# Patient Record
Sex: MALE | Race: WHITE | NOT HISPANIC OR LATINO | Employment: OTHER | URBAN - NONMETROPOLITAN AREA
[De-identification: names, ages, dates, MRNs, and addresses within clinical notes are randomized per-mention and may not be internally consistent; named-entity substitution may affect disease eponyms.]

---

## 2020-09-11 ENCOUNTER — HOSPITAL ENCOUNTER (EMERGENCY)
Facility: HOSPITAL | Age: 30
Discharge: HOME | End: 2020-09-11
Attending: FAMILY MEDICINE
Payer: OTHER GOVERNMENT

## 2020-09-11 VITALS
HEIGHT: 68 IN | RESPIRATION RATE: 20 BRPM | HEART RATE: 69 BPM | SYSTOLIC BLOOD PRESSURE: 139 MMHG | OXYGEN SATURATION: 100 % | TEMPERATURE: 99 F | WEIGHT: 198 LBS | BODY MASS INDEX: 30.01 KG/M2 | DIASTOLIC BLOOD PRESSURE: 79 MMHG

## 2020-09-11 DIAGNOSIS — R52 PAIN: ICD-10-CM

## 2020-09-11 DIAGNOSIS — S81.811A LACERATION OF RIGHT LOWER EXTREMITY, INITIAL ENCOUNTER: Primary | ICD-10-CM

## 2020-09-11 DIAGNOSIS — S80.11XA CONTUSION OF RIGHT LOWER EXTREMITY, INITIAL ENCOUNTER: ICD-10-CM

## 2020-09-11 PROCEDURE — 99283 EMERGENCY DEPT VISIT LOW MDM: CPT | Mod: 25

## 2020-09-11 PROCEDURE — 12001 RPR S/N/AX/GEN/TRNK 2.5CM/<: CPT

## 2020-09-11 PROCEDURE — 25000003 PHARM REV CODE 250: Performed by: NURSE PRACTITIONER

## 2020-09-11 RX ORDER — LIDOCAINE HYDROCHLORIDE 10 MG/ML
10 INJECTION INFILTRATION; PERINEURAL
Status: COMPLETED | OUTPATIENT
Start: 2020-09-11 | End: 2020-09-11

## 2020-09-11 RX ORDER — IBUPROFEN 800 MG/1
800 TABLET ORAL
Status: COMPLETED | OUTPATIENT
Start: 2020-09-11 | End: 2020-09-11

## 2020-09-11 RX ORDER — IBUPROFEN 800 MG/1
800 TABLET ORAL EVERY 6 HOURS PRN
Qty: 20 TABLET | Refills: 0 | Status: SHIPPED | OUTPATIENT
Start: 2020-09-11

## 2020-09-11 RX ADMIN — LIDOCAINE HYDROCHLORIDE 10 ML: 10 INJECTION, SOLUTION INFILTRATION; PERINEURAL at 06:09

## 2020-09-11 RX ADMIN — IBUPROFEN 800 MG: 800 TABLET, FILM COATED ORAL at 07:09

## 2020-09-11 RX ADMIN — BACITRACIN ZINC NEOMYCIN SULFATE POLYMYXIN B SULFATE: 400; 3.5; 5 OINTMENT TOPICAL at 07:09

## 2020-09-11 NOTE — ED NOTES
Wound cleansed with ns and gauze, bleeding resolved pta per pt, sybil, letty, ambulated from lobby with a steady gait

## 2020-09-11 NOTE — ED PROVIDER NOTES
Encounter Date: 9/11/2020       History     Chief Complaint   Patient presents with    Laceration     ran in to a pipe sticking out the ground and has laceration to right lower leg     Pt c/o pain and appr 1 cm laceration to right lower leg that he sustained PTA.  Pt states he was working outside and accidentally walked into a thick metal pipe sticking out of the ground.  Pt states he has mild bone tenderness around the laceration.  Pt denies any other injuries.  NO TX PTA.         Review of patient's allergies indicates:  No Known Allergies  Past Medical History:   Diagnosis Date    Hypertension      Past Surgical History:   Procedure Laterality Date    KNEE SURGERY      KNEE SURGERY      VASECTOMY       History reviewed. No pertinent family history.  Social History     Tobacco Use    Smoking status: Former Smoker    Smokeless tobacco: Former User   Substance Use Topics    Alcohol use: Yes     Alcohol/week: 2.0 standard drinks     Types: 2 Cans of beer per week    Drug use: Not on file     Review of Systems   Musculoskeletal:        + pain and laceration to right shin    Skin:        + laceration 1 cm to right shin    All other systems reviewed and are negative.      Physical Exam     Initial Vitals [09/11/20 1719]   BP Pulse Resp Temp SpO2   (!) 149/85 69 18 99.2 °F (37.3 °C) 100 %      MAP       --         Physical Exam    Nursing note and vitals reviewed.  Constitutional: He appears well-developed and well-nourished. He is not diaphoretic. No distress.   HENT:   Head: Normocephalic.   Eyes: Pupils are equal, round, and reactive to light.   Neck: Neck supple.   Cardiovascular: Normal rate.   Pulmonary/Chest: Breath sounds normal. No respiratory distress. He has no wheezes.   Abdominal: Soft. Bowel sounds are normal.   Musculoskeletal: Normal range of motion. No edema.      Right lower leg: He exhibits no swelling and no deformity. No edema.        Legs:    Neurological: He is alert and oriented to  person, place, and time. He has normal strength. GCS score is 15. GCS eye subscore is 4. GCS verbal subscore is 5. GCS motor subscore is 6.   Skin: Skin is warm. Capillary refill takes less than 2 seconds.              ED Course   Lac Repair    Date/Time: 9/11/2020 7:36 PM  Performed by: Seda Reyes NP  Authorized by: Ladarius Borrego Jr., MD   Body area: lower extremity  Location details: right lower leg  Foreign bodies: no foreign bodies  Tendon involvement: none  Nerve involvement: none  Vascular damage: no  Anesthesia: local infiltration    Anesthesia:  Local Anesthetic: lidocaine 1% without epinephrine  Anesthetic total: 3 mL  Patient sedated: no  Preparation: Patient was prepped and draped in the usual sterile fashion.  Irrigation solution: saline  Irrigation method: syringe  Amount of cleaning: extensive  Debridement: none  Degree of undermining: none  Skin closure: Ethilon  Number of sutures: 2  Technique: simple  Approximation: close  Approximation difficulty: simple  Dressing: antibiotic ointment and 4x4 sterile gauze  Patient tolerance: Patient tolerated the procedure well with no immediate complications        Labs Reviewed - No data to display       Imaging Results          X-Ray Tibia Fibula 2 View Right (Preliminary result)  Result time 09/11/20 19:33:35    ED Interpretation by Seda Reyes NP (09/11/20 19:33:35, [facility], Emergency Medicine)    No fracture                               Medical Decision Making:   Differential Diagnosis:   Simple laceration  Complex laceration  Fracture   Clinical Tests:   Radiological Study: Ordered                   ED Course as of Sep 11 1940   Fri Sep 11, 2020   1837 Care assumed from Dr. Borrego     [NL]   1933 No fracture or foreign body on x-ray     [NL]      ED Course User Index  [NL] Seda Reyes NP            Clinical Impression:       ICD-10-CM ICD-9-CM   1. Laceration of right lower extremity, initial encounter   S81.811A 894.0   2. Pain  R52 780.96   3. Contusion of right lower extremity, initial encounter  S80.11XA 924.5         Disposition:   Disposition: Discharged  Condition: Stable     ED Disposition Condition    Discharge Stable        ED Prescriptions     Medication Sig Dispense Start Date End Date Auth. Provider    ibuprofen (ADVIL,MOTRIN) 800 MG tablet Take 1 tablet (800 mg total) by mouth every 6 (six) hours as needed for Pain. 20 tablet 9/11/2020  Seda Reyes NP        Follow-up Information     Follow up With Specialties Details Why Contact Info    [facility]  Schedule an appointment as soon as possible for a visit  suture removal in 10-14 days, CONTINUATION OF CARE, fever, For wound re-check, If symptoms worsen [address]  [facility] [address]  [phone]                                       Seda Reyes NP  09/11/20 6375